# Patient Record
Sex: FEMALE | Race: WHITE | NOT HISPANIC OR LATINO | Employment: OTHER | URBAN - METROPOLITAN AREA
[De-identification: names, ages, dates, MRNs, and addresses within clinical notes are randomized per-mention and may not be internally consistent; named-entity substitution may affect disease eponyms.]

---

## 2019-10-12 ENCOUNTER — APPOINTMENT (OUTPATIENT)
Dept: RADIOLOGY | Facility: IMAGING CENTER | Age: 74
End: 2019-10-12
Attending: NURSE PRACTITIONER
Payer: MEDICARE

## 2019-10-12 ENCOUNTER — OFFICE VISIT (OUTPATIENT)
Dept: URGENT CARE | Facility: CLINIC | Age: 74
End: 2019-10-12
Payer: MEDICARE

## 2019-10-12 VITALS
HEIGHT: 60 IN | DIASTOLIC BLOOD PRESSURE: 64 MMHG | HEART RATE: 70 BPM | TEMPERATURE: 98.9 F | RESPIRATION RATE: 14 BRPM | SYSTOLIC BLOOD PRESSURE: 104 MMHG | OXYGEN SATURATION: 94 % | BODY MASS INDEX: 27.29 KG/M2 | WEIGHT: 139 LBS

## 2019-10-12 DIAGNOSIS — R05.9 COUGH: ICD-10-CM

## 2019-10-12 DIAGNOSIS — J22 LOWER RESPIRATORY INFECTION (E.G., BRONCHITIS, PNEUMONIA, PNEUMONITIS, PULMONITIS): ICD-10-CM

## 2019-10-12 PROCEDURE — 71046 X-RAY EXAM CHEST 2 VIEWS: CPT | Mod: TC | Performed by: NURSE PRACTITIONER

## 2019-10-12 PROCEDURE — 99204 OFFICE O/P NEW MOD 45 MIN: CPT | Performed by: NURSE PRACTITIONER

## 2019-10-12 RX ORDER — ESOMEPRAZOLE MAGNESIUM 40 MG/1
40 CAPSULE, DELAYED RELEASE ORAL
COMMUNITY

## 2019-10-12 RX ORDER — ATORVASTATIN CALCIUM 10 MG/1
10 TABLET, FILM COATED ORAL NIGHTLY
COMMUNITY

## 2019-10-12 RX ORDER — BENZONATATE 100 MG/1
100 CAPSULE ORAL 3 TIMES DAILY PRN
Qty: 60 CAP | Refills: 0 | Status: SHIPPED | OUTPATIENT
Start: 2019-10-12

## 2019-10-12 RX ORDER — AZITHROMYCIN 250 MG/1
TABLET, FILM COATED ORAL
Qty: 6 TAB | Refills: 0 | Status: SHIPPED | OUTPATIENT
Start: 2019-10-12

## 2019-10-12 ASSESSMENT — ENCOUNTER SYMPTOMS
WHEEZING: 1
HEADACHES: 0
SORE THROAT: 1
FEVER: 1
RHINORRHEA: 0
HEMOPTYSIS: 0
COUGH: 1

## 2019-10-12 ASSESSMENT — COPD QUESTIONNAIRES: COPD: 0

## 2019-10-12 NOTE — PATIENT INSTRUCTIONS
Symptomatic care.  -Oral hydration and rest.   -Cough control: nonpharmacologic options for cough relief such as throat lozenges, hot tea, honey.  -Over the counter expectorant as directed; Guaifenesin (Mucinex).  -Tylenol or ibuprofen for pain and fever as directed.   -Albuterol inhaler as directed.    Follow up with PCP. Follow up for increased or persistent shortness of breath or wheezing, fevers, elevated heart rate, weakness, prolonged cough, chest pain, or any other concerns.      Bronchitis  Bronchitis is the body's way of reacting to injury and/or infection (inflammation) of the bronchi. Bronchi are the air tubes that extend from the windpipe into the lungs. If the inflammation becomes severe, it may cause shortness of breath.  CAUSES   Inflammation may be caused by:  · A virus.  · Germs (bacteria).  · Dust.  · Allergens.  · Pollutants and many other irritants.  The cells lining the bronchial tree are covered with tiny hairs (cilia). These constantly beat upward, away from the lungs, toward the mouth. This keeps the lungs free of pollutants. When these cells become too irritated and are unable to do their job, mucus begins to develop. This causes the characteristic cough of bronchitis. The cough clears the lungs when the cilia are unable to do their job. Without either of these protective mechanisms, the mucus would settle in the lungs. Then you would develop pneumonia.  Smoking is a common cause of bronchitis and can contribute to pneumonia. Stopping this habit is the single most important thing you can do to help yourself.  TREATMENT   · Your caregiver may prescribe an antibiotic if the cough is caused by bacteria. Also, medicines that open up your airways make it easier to breathe. Your caregiver may also recommend or prescribe an expectorant. It will loosen the mucus to be coughed up. Only take over-the-counter or prescription medicines for pain, discomfort, or fever as directed by your  caregiver.  · Removing whatever causes the problem (smoking, for example) is critical to preventing the problem from getting worse.  · Cough suppressants may be prescribed for relief of cough symptoms.  · Inhaled medicines may be prescribed to help with symptoms now and to help prevent problems from returning.  · For those with recurrent (chronic) bronchitis, there may be a need for steroid medicines.  SEEK IMMEDIATE MEDICAL CARE IF:   · During treatment, you develop more pus-like mucus (purulent sputum).  · You have a fever.  · Your baby is older than 3 months with a rectal temperature of 102° F (38.9° C) or higher.  · Your baby is 3 months old or younger with a rectal temperature of 100.4° F (38° C) or higher.  · You become progressively more ill.  · You have increased difficulty breathing, wheezing, or shortness of breath.  It is necessary to seek immediate medical care if you are elderly or sick from any other disease.  MAKE SURE YOU:   · Understand these instructions.  · Will watch your condition.  · Will get help right away if you are not doing well or get worse.  Document Released: 12/18/2006 Document Revised: 03/11/2013 Document Reviewed: 10/27/2009  [employer]® Patient Information ©2014 [employer].

## 2019-10-12 NOTE — PROGRESS NOTES
Subjective:     Juju Thao is a 74 y.o. female who presents for Cough (chest congestion x3 days)      Dry cough x 3 days, now wet. Mid chest pain with cough. Sore throat started today. Fever of 102.4 this morning. Taken ASA which helps.  No swelling in legs. Flu shot 9/15. No sick contacts. Hoarse voice. Has body pain, consistent with arthritis. Plugged ears this morning, now resolved.             Cough   This is a new problem. The current episode started in the past 7 days. The problem has been gradually worsening. The cough is non-productive. Associated symptoms include ear congestion, a fever, a sore throat and wheezing. Pertinent negatives include no ear pain, eye redness, headaches, hemoptysis, nasal congestion, postnasal drip, rash, rhinorrhea or shortness of breath. There is no history of asthma, bronchitis, COPD, emphysema, environmental allergies or pneumonia.       History reviewed. No pertinent past medical history.    Past Surgical History:   Procedure Laterality Date   • BREAST BIOPSY  2003,2004    3,4 b9 masses removed       Social History     Socioeconomic History   • Marital status:      Spouse name: Not on file   • Number of children: Not on file   • Years of education: Not on file   • Highest education level: Not on file   Occupational History   • Not on file   Social Needs   • Financial resource strain: Not on file   • Food insecurity:     Worry: Not on file     Inability: Not on file   • Transportation needs:     Medical: Not on file     Non-medical: Not on file   Tobacco Use   • Smoking status: Never Smoker   • Smokeless tobacco: Never Used   Substance and Sexual Activity   • Alcohol use: Not Currently   • Drug use: Never   • Sexual activity: Not Currently     Partners: Male   Lifestyle   • Physical activity:     Days per week: Not on file     Minutes per session: Not on file   • Stress: Not on file   Relationships   • Social connections:     Talks on phone: Not on file     Gets  together: Not on file     Attends Gnosticism service: Not on file     Active member of club or organization: Not on file     Attends meetings of clubs or organizations: Not on file     Relationship status: Not on file   • Intimate partner violence:     Fear of current or ex partner: Not on file     Emotionally abused: Not on file     Physically abused: Not on file     Forced sexual activity: Not on file   Other Topics Concern   • Not on file   Social History Narrative   • Not on file        History reviewed. No pertinent family history.     Allergies   Allergen Reactions   • Boniva [Ibandronic Acid]        Review of Systems   Constitutional: Positive for fever and malaise/fatigue.   HENT: Positive for sore throat. Negative for congestion, ear discharge, ear pain, nosebleeds, postnasal drip, rhinorrhea, sinus pain and tinnitus.    Eyes: Negative for pain, discharge and redness.   Respiratory: Positive for cough and wheezing. Negative for hemoptysis, sputum production, shortness of breath and stridor.    Skin: Negative for rash.   Neurological: Negative for headaches.   Endo/Heme/Allergies: Negative for environmental allergies.   All other systems reviewed and are negative.       Objective:   /64 (BP Location: Left arm, Patient Position: Sitting)   Pulse 70   Temp 37.2 °C (98.9 °F)   Resp 14   Ht 1.524 m (5')   Wt 63 kg (139 lb)   SpO2 94%   BMI 27.15 kg/m²     Physical Exam   Constitutional: She is oriented to person, place, and time. Vital signs are normal. She appears well-developed. No distress.   HENT:   Head: Normocephalic and atraumatic.   Right Ear: Hearing, tympanic membrane, external ear and ear canal normal.   Left Ear: External ear normal. No drainage, swelling or tenderness. No mastoid tenderness. Tympanic membrane is not injected, not perforated and not erythematous.  No middle ear effusion. No hemotympanum.   Nose: Mucosal edema present. Right sinus exhibits no maxillary sinus tenderness and  no frontal sinus tenderness. Left sinus exhibits no maxillary sinus tenderness and no frontal sinus tenderness.   Mouth/Throat: Uvula is midline and mucous membranes are normal. No posterior oropharyngeal edema, posterior oropharyngeal erythema or tonsillar abscesses. No tonsillar exudate.   Clear post nasal drip.    Left ear occluded with cerumen. Curette used to remove wax from left canal. Pt tolerated procedure well. No erythema.    Eyes: Pupils are equal, round, and reactive to light. Conjunctivae and EOM are normal.   Neck: Normal range of motion and full passive range of motion without pain. Neck supple. No neck rigidity.   Cardiovascular: Normal rate, regular rhythm and intact distal pulses.   No peripheral edema.    Pulmonary/Chest: Effort normal. No accessory muscle usage or stridor. No tachypnea. No respiratory distress. She has decreased breath sounds in the right lower field. She has no wheezes. She has no rhonchi. She has no rales.   Cough noted.   Abdominal: Soft.   Musculoskeletal: Normal range of motion.   Lymphadenopathy:        Head (right side): No submental, no submandibular, no tonsillar, no preauricular, no posterior auricular and no occipital adenopathy present.        Head (left side): No submental, no submandibular, no tonsillar, no preauricular, no posterior auricular and no occipital adenopathy present.   Neurological: She is alert and oriented to person, place, and time. She has normal strength. GCS eye subscore is 4. GCS verbal subscore is 5. GCS motor subscore is 6.   Skin: Skin is warm and dry.   Psychiatric: She has a normal mood and affect. Her speech is normal and behavior is normal. Judgment and thought content normal.   Vitals reviewed.      Assessment/Plan:   1. Lower respiratory infection (e.g., bronchitis, pneumonia, pneumonitis, pulmonitis)  - azithromycin (ZITHROMAX) 250 MG Tab; Take 500 mg day one, 250 mg days 2-5.  Dispense: 6 Tab; Refill: 0  - benzonatate (TESSALON) 100 MG  Cap; Take 1 Cap by mouth 3 times a day as needed for Cough.  Dispense: 60 Cap; Refill: 0    2. Cough  - DX-CHEST-2 VIEWS; -Mild cardiomegaly. Perihilar opacifications could be due to edema or congestion versus inflammation or bronchitis. No consolidations identified  - benzonatate (TESSALON) 100 MG Cap; Take 1 Cap by mouth 3 times a day as needed for Cough.  Dispense: 60 Cap; Refill: 0    Symptomatic care.  -Oral hydration and rest.   -Cough control: nonpharmacologic options for cough relief such as throat lozenges, hot tea, honey.  -Over the counter expectorant as directed; Guaifenesin (Mucinex).  -Tylenol or ibuprofen for pain and fever as directed.     Discussed x-ray findings, including mild cardiomegaly. Follow up with PCP. Follow up for increased or persistent shortness of breath or wheezing, fevers, elevated heart rate, weakness, prolonged cough, chest pain, or any other concerns.    Differential diagnosis, natural history, supportive care, and indications for immediate follow-up discussed.

## 2019-10-14 ASSESSMENT — ENCOUNTER SYMPTOMS
EYE REDNESS: 0
SPUTUM PRODUCTION: 0
EYE PAIN: 0
SINUS PAIN: 0
STRIDOR: 0
EYE DISCHARGE: 0
SHORTNESS OF BREATH: 0

## 2024-09-26 ENCOUNTER — APPOINTMENT (OUTPATIENT)
Dept: URGENT CARE | Facility: CLINIC | Age: 79
End: 2024-09-26
Payer: COMMERCIAL